# Patient Record
Sex: FEMALE | Race: WHITE | NOT HISPANIC OR LATINO | URBAN - METROPOLITAN AREA
[De-identification: names, ages, dates, MRNs, and addresses within clinical notes are randomized per-mention and may not be internally consistent; named-entity substitution may affect disease eponyms.]

---

## 2023-09-01 ENCOUNTER — HOSPITAL ENCOUNTER (OUTPATIENT)
Dept: DATA CONVERSION | Facility: HOSPITAL | Age: 58
Discharge: HOME | End: 2023-09-01

## 2023-09-01 DIAGNOSIS — Z01.818 ENCOUNTER FOR OTHER PREPROCEDURAL EXAMINATION: ICD-10-CM

## 2023-09-01 LAB
ANION GAP SERPL CALCULATED.3IONS-SCNC: 12 MMOL/L (ref 0–19)
BACTERIA SPEC CULT: NORMAL
BASOPHILS # BLD AUTO: 0.07 K/UL (ref 0–0.22)
BASOPHILS NFR BLD AUTO: 1.3 % (ref 0–1)
BUN SERPL-MCNC: 13 MG/DL (ref 8–25)
BUN/CREAT SERPL: 18.6 RATIO (ref 8–21)
CALCIUM SERPL-MCNC: 9.1 MG/DL (ref 8.5–10.4)
CC # UR: NORMAL /UL
CHLORIDE SERPL-SCNC: 106 MMOL/L (ref 97–107)
CO2 SERPL-SCNC: 23 MMOL/L (ref 24–31)
CREAT SERPL-MCNC: 0.7 MG/DL (ref 0.4–1.6)
DEPRECATED RDW RBC AUTO: 47.1 FL (ref 37–54)
DIFFERENTIAL METHOD BLD: ABNORMAL
EOSINOPHIL # BLD AUTO: 0.2 K/UL (ref 0–0.45)
EOSINOPHIL NFR BLD: 3.8 % (ref 0–3)
ERYTHROCYTE [DISTWIDTH] IN BLOOD BY AUTOMATED COUNT: 13.9 % (ref 11.7–15)
GFR SERPL CREATININE-BSD FRML MDRD: 101 ML/MIN/1.73 M2
GLUCOSE SERPL-MCNC: 95 MG/DL (ref 65–99)
HCT VFR BLD AUTO: 39.4 % (ref 36–44)
HGB BLD-MCNC: 12.7 GM/DL (ref 12–15)
IMM GRANULOCYTES # BLD AUTO: 0.01 K/UL (ref 0–0.1)
LYMPHOCYTES # BLD AUTO: 1.7 K/UL (ref 1.2–3.2)
LYMPHOCYTES NFR BLD MANUAL: 31.9 % (ref 20–40)
MCH RBC QN AUTO: 29.5 PG (ref 26–34)
MCHC RBC AUTO-ENTMCNC: 32.2 % (ref 31–37)
MCV RBC AUTO: 91.6 FL (ref 80–100)
MONOCYTES # BLD AUTO: 0.47 K/UL (ref 0–0.8)
MONOCYTES NFR BLD MANUAL: 8.8 % (ref 0–8)
NEUTROPHILS # BLD AUTO: 2.88 K/UL
NEUTROPHILS # BLD AUTO: 2.88 K/UL (ref 1.8–7.7)
NEUTROPHILS.IMMATURE NFR BLD: 0.2 % (ref 0–1)
NEUTS SEG NFR BLD: 54 % (ref 50–70)
NRBC BLD-RTO: 0 /100 WBC
PLATELET # BLD AUTO: 238 K/UL (ref 150–450)
PMV BLD AUTO: 9.8 CU (ref 7–12.6)
POTASSIUM SERPL-SCNC: 4.4 MMOL/L (ref 3.4–5.1)
RBC # BLD AUTO: 4.3 M/UL (ref 4–4.9)
REPORT STATUS -LH SQ DATA CONVERSION: NORMAL
SERVICE CMNT-IMP: NORMAL
SODIUM SERPL-SCNC: 141 MMOL/L (ref 133–145)
SPECIMEN SOURCE: NORMAL
WBC # BLD AUTO: 5.3 K/UL (ref 4.5–11)

## 2024-08-23 ENCOUNTER — APPOINTMENT (OUTPATIENT)
Dept: RADIOLOGY | Facility: HOSPITAL | Age: 59
End: 2024-08-23
Payer: COMMERCIAL

## 2024-08-23 DIAGNOSIS — Z12.31 SCREENING MAMMOGRAM FOR BREAST CANCER: ICD-10-CM

## 2024-09-27 ENCOUNTER — APPOINTMENT (OUTPATIENT)
Dept: OBSTETRICS AND GYNECOLOGY | Facility: CLINIC | Age: 59
End: 2024-09-27
Payer: COMMERCIAL

## 2024-09-30 PROCEDURE — 88142 CYTOPATH C/V THIN LAYER: CPT

## 2024-09-30 PROCEDURE — 87624 HPV HI-RISK TYP POOLED RSLT: CPT

## 2024-10-01 ENCOUNTER — HOSPITAL ENCOUNTER (OUTPATIENT)
Dept: RADIOLOGY | Facility: HOSPITAL | Age: 59
Discharge: HOME | End: 2024-10-01
Payer: COMMERCIAL

## 2024-10-01 VITALS — HEIGHT: 69 IN | BODY MASS INDEX: 30.36 KG/M2 | WEIGHT: 205 LBS

## 2024-10-01 DIAGNOSIS — Z12.31 SCREENING MAMMOGRAM FOR BREAST CANCER: ICD-10-CM

## 2024-10-01 PROCEDURE — 77063 BREAST TOMOSYNTHESIS BI: CPT | Performed by: RADIOLOGY

## 2024-10-01 PROCEDURE — 77067 SCR MAMMO BI INCL CAD: CPT

## 2024-10-01 PROCEDURE — 77067 SCR MAMMO BI INCL CAD: CPT | Performed by: RADIOLOGY

## 2024-10-02 ENCOUNTER — LAB REQUISITION (OUTPATIENT)
Dept: LAB | Facility: HOSPITAL | Age: 59
End: 2024-10-02
Payer: COMMERCIAL

## 2024-10-02 DIAGNOSIS — Z11.51 ENCOUNTER FOR SCREENING FOR HUMAN PAPILLOMAVIRUS (HPV): ICD-10-CM

## 2024-10-02 DIAGNOSIS — Z12.4 ENCOUNTER FOR SCREENING FOR MALIGNANT NEOPLASM OF CERVIX: ICD-10-CM

## 2024-10-02 DIAGNOSIS — Z01.419 ENCOUNTER FOR GYNECOLOGICAL EXAMINATION (GENERAL) (ROUTINE) WITHOUT ABNORMAL FINDINGS: ICD-10-CM

## 2024-10-04 ENCOUNTER — LAB (OUTPATIENT)
Dept: LAB | Facility: LAB | Age: 59
End: 2024-10-04
Payer: COMMERCIAL

## 2024-10-04 ENCOUNTER — OFFICE VISIT (OUTPATIENT)
Dept: PRIMARY CARE | Facility: CLINIC | Age: 59
End: 2024-10-04
Payer: COMMERCIAL

## 2024-10-04 VITALS
HEART RATE: 55 BPM | HEIGHT: 69 IN | WEIGHT: 204 LBS | BODY MASS INDEX: 30.21 KG/M2 | DIASTOLIC BLOOD PRESSURE: 72 MMHG | SYSTOLIC BLOOD PRESSURE: 120 MMHG | OXYGEN SATURATION: 99 %

## 2024-10-04 DIAGNOSIS — E05.90 HYPERTHYROIDISM: ICD-10-CM

## 2024-10-04 DIAGNOSIS — E04.1 THYROID NODULE: Primary | ICD-10-CM

## 2024-10-04 DIAGNOSIS — E04.1 THYROID NODULE: ICD-10-CM

## 2024-10-04 PROBLEM — M16.11 OSTEOARTHRITIS OF RIGHT HIP: Status: ACTIVE | Noted: 2024-10-04

## 2024-10-04 PROBLEM — N81.11 MIDLINE CYSTOCELE: Status: ACTIVE | Noted: 2024-10-04

## 2024-10-04 PROBLEM — N95.1 PERI-MENOPAUSE: Status: ACTIVE | Noted: 2024-10-04

## 2024-10-04 PROBLEM — H52.7 REFRACTIVE ERROR: Status: RESOLVED | Noted: 2024-10-04 | Resolved: 2024-10-04

## 2024-10-04 PROBLEM — R73.01 IMPAIRED FASTING GLUCOSE: Status: ACTIVE | Noted: 2024-10-04

## 2024-10-04 PROBLEM — H35.62 RETINAL HEMORRHAGE OF LEFT EYE: Status: RESOLVED | Noted: 2024-10-04 | Resolved: 2024-10-04

## 2024-10-04 PROBLEM — H33.309 DEFECT, RETINAL: Status: ACTIVE | Noted: 2024-10-04

## 2024-10-04 PROBLEM — I83.90 VARICOSE VEINS OF LOWER EXTREMITY: Status: ACTIVE | Noted: 2024-10-04

## 2024-10-04 LAB — TSH SERPL-ACNC: 2.1 MIU/L (ref 0.44–3.98)

## 2024-10-04 PROCEDURE — 86376 MICROSOMAL ANTIBODY EACH: CPT

## 2024-10-04 PROCEDURE — 84482 T3 REVERSE: CPT

## 2024-10-04 PROCEDURE — 1036F TOBACCO NON-USER: CPT | Performed by: PHYSICIAN ASSISTANT

## 2024-10-04 PROCEDURE — 84480 ASSAY TRIIODOTHYRONINE (T3): CPT

## 2024-10-04 PROCEDURE — 36415 COLL VENOUS BLD VENIPUNCTURE: CPT

## 2024-10-04 PROCEDURE — 3008F BODY MASS INDEX DOCD: CPT | Performed by: PHYSICIAN ASSISTANT

## 2024-10-04 PROCEDURE — 99203 OFFICE O/P NEW LOW 30 MIN: CPT | Performed by: PHYSICIAN ASSISTANT

## 2024-10-04 PROCEDURE — 84443 ASSAY THYROID STIM HORMONE: CPT

## 2024-10-04 RX ORDER — OMEGA-3 FATTY ACIDS 1000 MG
1 CAPSULE ORAL DAILY
COMMUNITY

## 2024-10-04 RX ORDER — UBIDECARENONE 75 MG
1000 CAPSULE ORAL DAILY
COMMUNITY

## 2024-10-04 RX ORDER — VIT C/E/ZN/COPPR/LUTEIN/ZEAXAN 250MG-90MG
25 CAPSULE ORAL DAILY
COMMUNITY

## 2024-10-04 RX ORDER — ASCORBIC ACID 500 MG
500 TABLET ORAL DAILY
COMMUNITY

## 2024-10-04 ASSESSMENT — ENCOUNTER SYMPTOMS
FATIGUE: 1
WEIGHT GAIN: 1
TREMORS: 0

## 2024-10-04 ASSESSMENT — PAIN SCALES - GENERAL: PAINLEVEL: 0-NO PAIN

## 2024-10-04 NOTE — PROGRESS NOTES
"Subjective   Patient ID: Viridiana Munoz is a 58 y.o. female who presents for Establish Care and Thyroid Problem (Has never been on medication, history of thyroid cyst and hyperthyroidism.).    Thyroid Problem  Presents for initial visit. Symptoms include fatigue, menstrual problem and weight gain. Patient reports no tremors.    Had thyroid scan and uptake over 10 years ago. Was never placed on any hormone treatment for her thyroid.     Review of Systems   Constitutional:  Positive for fatigue and weight gain.   Genitourinary:  Positive for menstrual problem.   Neurological:  Negative for tremors.       Objective   /72   Pulse 55   Ht 1.753 m (5' 9\")   Wt 92.5 kg (204 lb)   LMP  (LMP Unknown)   SpO2 99%   BMI 30.13 kg/m²     Physical Exam  Constitutional:       Appearance: Normal appearance.   Eyes:      Extraocular Movements: Extraocular movements intact.      Pupils: Pupils are equal, round, and reactive to light.   Neck:      Thyroid: Thyromegaly present.   Cardiovascular:      Rate and Rhythm: Normal rate and regular rhythm.      Pulses: Normal pulses.      Heart sounds: Normal heart sounds.   Pulmonary:      Effort: Pulmonary effort is normal.   Musculoskeletal:      Cervical back: Neck supple.   Neurological:      General: No focal deficit present.      Mental Status: She is alert. Mental status is at baseline.   Psychiatric:         Mood and Affect: Mood normal.         Thought Content: Thought content normal.         Judgment: Judgment normal.         Assessment/Plan   Diagnoses and all orders for this visit:  Thyroid nodule  -     TSH with reflex to Free T4 if abnormal; Future  -     Thyroid Peroxidase (TPO) Antibody; Future  -     T3; Future  -     T3, Reverse; Future  -     US thyroid; Future  Hyperthyroidism  -     TSH with reflex to Free T4 if abnormal; Future  Other orders  -     Follow Up In Primary Care - Health Maintenance; Future    Will follow up for her CPE. Will see what her labs " and US show. Reviewed her last set of labs and thyroid US results.

## 2024-10-05 LAB
T3 SERPL-MCNC: 119 NG/DL (ref 60–200)
THYROPEROXIDASE AB SERPL-ACNC: <28 IU/ML

## 2024-10-09 LAB — T3REVERSE SERPL-MCNC: 25.3 NG/DL (ref 9–27)

## 2024-10-11 ENCOUNTER — HOSPITAL ENCOUNTER (OUTPATIENT)
Dept: RADIOLOGY | Facility: HOSPITAL | Age: 59
Discharge: HOME | End: 2024-10-11
Payer: COMMERCIAL

## 2024-10-11 DIAGNOSIS — E04.1 THYROID NODULE: ICD-10-CM

## 2024-10-11 PROCEDURE — 88305 TISSUE EXAM BY PATHOLOGIST: CPT | Performed by: PATHOLOGY

## 2024-10-11 PROCEDURE — 76536 US EXAM OF HEAD AND NECK: CPT

## 2024-10-11 PROCEDURE — 88305 TISSUE EXAM BY PATHOLOGIST: CPT

## 2024-10-14 ENCOUNTER — LAB REQUISITION (OUTPATIENT)
Dept: LAB | Facility: HOSPITAL | Age: 59
End: 2024-10-14
Payer: COMMERCIAL

## 2024-10-14 DIAGNOSIS — N95.0 POSTMENOPAUSAL BLEEDING: ICD-10-CM

## 2024-10-14 LAB
CYTOLOGY CMNT CVX/VAG CYTO-IMP: NORMAL
HPV HR 12 DNA GENITAL QL NAA+PROBE: NEGATIVE
HPV HR GENOTYPES PNL CVX NAA+PROBE: NEGATIVE
HPV16 DNA SPEC QL NAA+PROBE: NEGATIVE
HPV18 DNA SPEC QL NAA+PROBE: NEGATIVE
LAB AP HPV GENOTYPE QUESTION: YES
LAB AP HPV HR: NORMAL
LABORATORY COMMENT REPORT: NORMAL
LABORATORY COMMENT REPORT: NORMAL
MENSTRUAL HX REPORTED: NORMAL
PATH REPORT.TOTAL CANCER: NORMAL

## 2024-10-17 LAB
LABORATORY COMMENT REPORT: NORMAL
PATH REPORT.FINAL DX SPEC: NORMAL
PATH REPORT.GROSS SPEC: NORMAL
PATH REPORT.RELEVANT HX SPEC: NORMAL
PATH REPORT.TOTAL CANCER: NORMAL

## 2024-11-08 ENCOUNTER — APPOINTMENT (OUTPATIENT)
Dept: PRIMARY CARE | Facility: CLINIC | Age: 59
End: 2024-11-08
Payer: COMMERCIAL

## 2024-11-08 VITALS
SYSTOLIC BLOOD PRESSURE: 112 MMHG | DIASTOLIC BLOOD PRESSURE: 68 MMHG | OXYGEN SATURATION: 98 % | HEART RATE: 76 BPM | BODY MASS INDEX: 29.53 KG/M2 | WEIGHT: 200 LBS

## 2024-11-08 DIAGNOSIS — E04.1 THYROID NODULE: ICD-10-CM

## 2024-11-08 DIAGNOSIS — Z00.00 ROUTINE GENERAL MEDICAL EXAMINATION AT A HEALTH CARE FACILITY: Primary | ICD-10-CM

## 2024-11-08 DIAGNOSIS — I83.813 VARICOSE VEINS OF BOTH LOWER EXTREMITIES WITH PAIN: ICD-10-CM

## 2024-11-08 DIAGNOSIS — Z12.11 COLON CANCER SCREENING: ICD-10-CM

## 2024-11-08 DIAGNOSIS — E05.90 HYPERTHYROIDISM: ICD-10-CM

## 2024-11-08 DIAGNOSIS — R73.01 IMPAIRED FASTING GLUCOSE: ICD-10-CM

## 2024-11-08 PROBLEM — N95.0 POST-MENOPAUSAL BLEEDING: Status: RESOLVED | Noted: 2024-11-08 | Resolved: 2024-11-08

## 2024-11-08 PROCEDURE — 1036F TOBACCO NON-USER: CPT | Performed by: PHYSICIAN ASSISTANT

## 2024-11-08 PROCEDURE — 99396 PREV VISIT EST AGE 40-64: CPT | Performed by: PHYSICIAN ASSISTANT

## 2024-11-08 ASSESSMENT — ENCOUNTER SYMPTOMS
DIZZINESS: 0
SHORTNESS OF BREATH: 0
SLEEP DISTURBANCE: 0
ACTIVITY CHANGE: 0
MYALGIAS: 0
BLOOD IN STOOL: 0
FATIGUE: 1
COLOR CHANGE: 0
CONSTIPATION: 0
CHEST TIGHTNESS: 0
NERVOUS/ANXIOUS: 0
PALPITATIONS: 0
ABDOMINAL PAIN: 0
APPETITE CHANGE: 0
LIGHT-HEADEDNESS: 0
WHEEZING: 0
ARTHRALGIAS: 0
FREQUENCY: 0
TREMORS: 0
DIARRHEA: 0
NAUSEA: 0

## 2024-11-08 ASSESSMENT — PATIENT HEALTH QUESTIONNAIRE - PHQ9
2. FEELING DOWN, DEPRESSED OR HOPELESS: NOT AT ALL
1. LITTLE INTEREST OR PLEASURE IN DOING THINGS: NOT AT ALL
SUM OF ALL RESPONSES TO PHQ9 QUESTIONS 1 AND 2: 0

## 2024-11-08 ASSESSMENT — PROMIS GLOBAL HEALTH SCALE
CARRYOUT_SOCIAL_ACTIVITIES: VERY GOOD
RATE_PHYSICAL_HEALTH: EXCELLENT
RATE_QUALITY_OF_LIFE: EXCELLENT
RATE_AVERAGE_PAIN: 1
CARRYOUT_PHYSICAL_ACTIVITIES: COMPLETELY
EMOTIONAL_PROBLEMS: SOMETIMES
RATE_SOCIAL_SATISFACTION: VERY GOOD
RATE_GENERAL_HEALTH: EXCELLENT
RATE_MENTAL_HEALTH: VERY GOOD

## 2024-11-08 ASSESSMENT — PAIN SCALES - GENERAL: PAINLEVEL_OUTOF10: 0-NO PAIN

## 2024-11-08 NOTE — PROGRESS NOTES
Subjective   Patient ID: Viridiana Munoz is a 58 y.o. female who presents for Annual Exam (Sees gyn).    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  Been seeing her GYN for an episode of postmenopausal bleeding.  Had a endometrial biopsy done along with ultrasound and so far is looking good.  Will follow-up as suggested.  Review of Systems   Constitutional:  Positive for fatigue. Negative for activity change and appetite change.   HENT:  Negative for dental problem.    Eyes:  Negative for visual disturbance.   Respiratory:  Negative for chest tightness, shortness of breath and wheezing.    Cardiovascular:  Negative for chest pain, palpitations and leg swelling.   Gastrointestinal:  Negative for abdominal pain, blood in stool, constipation, diarrhea and nausea.   Endocrine: Negative for cold intolerance and heat intolerance.   Genitourinary:  Negative for frequency and urgency.   Musculoskeletal:  Negative for arthralgias and myalgias.   Skin:  Negative for color change.   Allergic/Immunologic: Negative for immunocompromised state.   Neurological:  Negative for dizziness, tremors and light-headedness.   Psychiatric/Behavioral:  Negative for behavioral problems and sleep disturbance. The patient is not nervous/anxious.        Objective   /68   Pulse 76   Wt 90.7 kg (200 lb)   LMP  (LMP Unknown)   SpO2 98%   BMI 29.53 kg/m²     Physical Exam  HENT:      Right Ear: Tympanic membrane normal.      Left Ear: Tympanic membrane normal.      Nose: Nose normal.      Mouth/Throat:      Mouth: Mucous membranes are moist.   Eyes:      Extraocular Movements: Extraocular movements intact.      Pupils: Pupils are equal, round, and reactive to light.   Neck:      Thyroid: Thyromegaly present.      Vascular: No carotid bruit.   Cardiovascular:      Rate and Rhythm: Normal rate and regular rhythm.      Pulses: Normal pulses.   Pulmonary:      Effort: Pulmonary effort is normal. No respiratory distress.    Abdominal:      General: Bowel sounds are normal.      Tenderness: There is no abdominal tenderness.   Musculoskeletal:      Cervical back: Normal range of motion. No tenderness.      Right lower leg: No edema.      Left lower leg: No edema.   Skin:     General: Skin is warm.      Comments: She has varicose veins about mid thigh down bilaterally.  The ones behind the right knee really bother her.   Neurological:      General: No focal deficit present.      Mental Status: She is alert. Mental status is at baseline.   Psychiatric:         Mood and Affect: Mood normal.         Thought Content: Thought content normal.         Judgment: Judgment normal.         Assessment/Plan   Diagnoses and all orders for this visit:  Routine general medical examination at a health care facility  -     CBC and Auto Differential; Future  -     Lipid Panel; Future  -     Urinalysis with Reflex Microscopic; Future  Impaired fasting glucose  -     Comprehensive Metabolic Panel; Future  -     Urinalysis with Reflex Microscopic; Future  -     Hemoglobin A1C; Future  Hyperthyroidism  -     Comprehensive Metabolic Panel; Future  -     TSH with reflex to Free T4 if abnormal; Future  Thyroid nodule  -     Referral to ENT; Future  Varicose veins of both lower extremities with pain  -     Referral to Vascular Surgery; Future  Colon cancer screening  -     Referral to Gastroenterology; Future  Other orders  -     Follow Up In Primary Care - Health Maintenance    He is also due for colonoscopy.  Her last 1 she did have a few polyps.  Her recent thyroid ultrasound did show a few nodules but she is never seen ENT in the past to see if any biopsied.  Will refer accordingly.

## 2024-11-23 ENCOUNTER — LAB (OUTPATIENT)
Dept: LAB | Facility: LAB | Age: 59
End: 2024-11-23
Payer: COMMERCIAL

## 2024-11-23 DIAGNOSIS — Z00.00 ROUTINE GENERAL MEDICAL EXAMINATION AT A HEALTH CARE FACILITY: ICD-10-CM

## 2024-11-23 DIAGNOSIS — R73.01 IMPAIRED FASTING GLUCOSE: ICD-10-CM

## 2024-11-23 DIAGNOSIS — E05.90 HYPERTHYROIDISM: ICD-10-CM

## 2024-11-23 LAB
ALBUMIN SERPL BCP-MCNC: 4.2 G/DL (ref 3.4–5)
ALP SERPL-CCNC: 52 U/L (ref 33–110)
ALT SERPL W P-5'-P-CCNC: 13 U/L (ref 7–45)
ANION GAP SERPL CALCULATED.3IONS-SCNC: 9 MMOL/L (ref 10–20)
APPEARANCE UR: CLEAR
AST SERPL W P-5'-P-CCNC: 22 U/L (ref 9–39)
BASOPHILS # BLD AUTO: 0.09 X10*3/UL (ref 0–0.1)
BASOPHILS NFR BLD AUTO: 1.3 %
BILIRUB SERPL-MCNC: 0.4 MG/DL (ref 0–1.2)
BILIRUB UR STRIP.AUTO-MCNC: NEGATIVE MG/DL
BUN SERPL-MCNC: 11 MG/DL (ref 6–23)
CALCIUM SERPL-MCNC: 8.9 MG/DL (ref 8.6–10.3)
CHLORIDE SERPL-SCNC: 107 MMOL/L (ref 98–107)
CHOLEST SERPL-MCNC: 168 MG/DL (ref 0–199)
CHOLEST/HDLC SERPL: 3.2 {RATIO}
CO2 SERPL-SCNC: 27 MMOL/L (ref 21–32)
COLOR UR: COLORLESS
CREAT SERPL-MCNC: 0.6 MG/DL (ref 0.5–1.05)
EGFRCR SERPLBLD CKD-EPI 2021: >90 ML/MIN/1.73M*2
EOSINOPHIL # BLD AUTO: 0.16 X10*3/UL (ref 0–0.7)
EOSINOPHIL NFR BLD AUTO: 2.4 %
ERYTHROCYTE [DISTWIDTH] IN BLOOD BY AUTOMATED COUNT: 13 % (ref 11.5–14.5)
EST. AVERAGE GLUCOSE BLD GHB EST-MCNC: 120 MG/DL
GLUCOSE SERPL-MCNC: 98 MG/DL (ref 74–99)
GLUCOSE UR STRIP.AUTO-MCNC: NORMAL MG/DL
HBA1C MFR BLD: 5.8 %
HCT VFR BLD AUTO: 40.1 % (ref 36–46)
HDLC SERPL-MCNC: 52.8 MG/DL
HGB BLD-MCNC: 13.2 G/DL (ref 12–16)
IMM GRANULOCYTES # BLD AUTO: 0.05 X10*3/UL (ref 0–0.7)
IMM GRANULOCYTES NFR BLD AUTO: 0.7 % (ref 0–0.9)
KETONES UR STRIP.AUTO-MCNC: NEGATIVE MG/DL
LDLC SERPL CALC-MCNC: 103 MG/DL
LEUKOCYTE ESTERASE UR QL STRIP.AUTO: NEGATIVE
LYMPHOCYTES # BLD AUTO: 2.41 X10*3/UL (ref 1.2–4.8)
LYMPHOCYTES NFR BLD AUTO: 35.8 %
MCH RBC QN AUTO: 29.7 PG (ref 26–34)
MCHC RBC AUTO-ENTMCNC: 32.9 G/DL (ref 32–36)
MCV RBC AUTO: 90 FL (ref 80–100)
MONOCYTES # BLD AUTO: 0.55 X10*3/UL (ref 0.1–1)
MONOCYTES NFR BLD AUTO: 8.2 %
NEUTROPHILS # BLD AUTO: 3.47 X10*3/UL (ref 1.2–7.7)
NEUTROPHILS NFR BLD AUTO: 51.6 %
NITRITE UR QL STRIP.AUTO: NEGATIVE
NON HDL CHOLESTEROL: 115 MG/DL (ref 0–149)
NRBC BLD-RTO: 0 /100 WBCS (ref 0–0)
PH UR STRIP.AUTO: 6 [PH]
PLATELET # BLD AUTO: 233 X10*3/UL (ref 150–450)
POTASSIUM SERPL-SCNC: 4.3 MMOL/L (ref 3.5–5.3)
PROT SERPL-MCNC: 6.9 G/DL (ref 6.4–8.2)
PROT UR STRIP.AUTO-MCNC: NEGATIVE MG/DL
RBC # BLD AUTO: 4.44 X10*6/UL (ref 4–5.2)
RBC # UR STRIP.AUTO: NEGATIVE /UL
SODIUM SERPL-SCNC: 139 MMOL/L (ref 136–145)
SP GR UR STRIP.AUTO: 1
TRIGL SERPL-MCNC: 60 MG/DL (ref 0–149)
TSH SERPL-ACNC: 1.9 MIU/L (ref 0.44–3.98)
UROBILINOGEN UR STRIP.AUTO-MCNC: NORMAL MG/DL
VLDL: 12 MG/DL (ref 0–40)
WBC # BLD AUTO: 6.7 X10*3/UL (ref 4.4–11.3)

## 2024-11-23 PROCEDURE — 85025 COMPLETE CBC W/AUTO DIFF WBC: CPT

## 2024-11-23 PROCEDURE — 81003 URINALYSIS AUTO W/O SCOPE: CPT

## 2024-11-23 PROCEDURE — 84443 ASSAY THYROID STIM HORMONE: CPT

## 2024-11-23 PROCEDURE — 36415 COLL VENOUS BLD VENIPUNCTURE: CPT

## 2024-11-23 PROCEDURE — 80053 COMPREHEN METABOLIC PANEL: CPT

## 2024-11-23 PROCEDURE — 83036 HEMOGLOBIN GLYCOSYLATED A1C: CPT

## 2024-11-23 PROCEDURE — 80061 LIPID PANEL: CPT

## 2025-03-22 ENCOUNTER — ANCILLARY PROCEDURE (OUTPATIENT)
Dept: URGENT CARE | Age: 60
End: 2025-03-22
Payer: COMMERCIAL

## 2025-03-22 ENCOUNTER — OFFICE VISIT (OUTPATIENT)
Dept: URGENT CARE | Age: 60
End: 2025-03-22
Payer: COMMERCIAL

## 2025-03-22 VITALS
SYSTOLIC BLOOD PRESSURE: 127 MMHG | TEMPERATURE: 98.2 F | RESPIRATION RATE: 19 BRPM | DIASTOLIC BLOOD PRESSURE: 79 MMHG | OXYGEN SATURATION: 98 % | BODY MASS INDEX: 30.27 KG/M2 | HEART RATE: 64 BPM | WEIGHT: 205 LBS

## 2025-03-22 DIAGNOSIS — R05.1 ACUTE COUGH: ICD-10-CM

## 2025-03-22 DIAGNOSIS — R06.2 WHEEZING: ICD-10-CM

## 2025-03-22 DIAGNOSIS — J20.9 ACUTE BRONCHITIS, UNSPECIFIED ORGANISM: Primary | ICD-10-CM

## 2025-03-22 PROCEDURE — 71046 X-RAY EXAM CHEST 2 VIEWS: CPT | Performed by: NURSE PRACTITIONER

## 2025-03-22 RX ORDER — PREDNISONE 20 MG/1
40 TABLET ORAL DAILY
Qty: 10 TABLET | Refills: 0 | Status: SHIPPED | OUTPATIENT
Start: 2025-03-22 | End: 2025-03-27

## 2025-03-22 RX ORDER — ALBUTEROL SULFATE 90 UG/1
2 INHALANT RESPIRATORY (INHALATION) EVERY 6 HOURS PRN
Qty: 18 G | Refills: 0 | Status: SHIPPED | OUTPATIENT
Start: 2025-03-22 | End: 2026-03-22

## 2025-03-22 RX ORDER — AZITHROMYCIN 250 MG/1
TABLET, FILM COATED ORAL
Qty: 6 TABLET | Refills: 0 | Status: SHIPPED | OUTPATIENT
Start: 2025-03-22

## 2025-03-22 RX ORDER — BENZONATATE 200 MG/1
200 CAPSULE ORAL 3 TIMES DAILY PRN
Qty: 21 CAPSULE | Refills: 0 | Status: SHIPPED | OUTPATIENT
Start: 2025-03-22 | End: 2025-03-29

## 2025-03-22 ASSESSMENT — ENCOUNTER SYMPTOMS
SORE THROAT: 1
CHEST TIGHTNESS: 1
VOICE CHANGE: 1
COUGH: 1
WHEEZING: 1
FATIGUE: 1
SHORTNESS OF BREATH: 1
VOMITING: 1

## 2025-03-22 NOTE — PROGRESS NOTES
Subjective   Patient ID: Viridiana Munoz is a 59 y.o. female. They present today with a chief complaint of Cough (Was positive with covid last day, she is feeling better, her cough has worsened, keeping her up at night, educing vomiting, she is unable to to take cough suppressants due to a adverse reaction.).    History of Present Illness  Patient presents today with a chief complaint of Cough. Reports testing positive for Covid about 10 days ago. Her other symptoms subsided but cough has gotten worse. She is unable to sleep at night due to continues and forceful coughing. Unable to take OTC with Dextromethorphan. Reports vomiting due to forceful cough, but denies diarrhea or fever.     Past Medical History  Allergies as of 03/22/2025 - Reviewed 03/22/2025   Allergen Reaction Noted    Dextromethorphan Anxiety 10/04/2024    Sertraline Anxiety 10/04/2024       (Not in a hospital admission)       Past Medical History:   Diagnosis Date    Acute pharyngitis, unspecified 09/17/2015    Sore throat    BRCA1 negative 7/2020    BRCA2 negative 7/2020    Fibrocystic breast 1/2012    Other specified health status 02/11/2015    No pertinent past medical history    Personal history of other endocrine, nutritional and metabolic disease 12/11/2015    History of hyperthyroidism    Post-menopausal bleeding 11/08/2024    Refractive error 10/04/2024    Retinal hemorrhage of left eye 10/04/2024       Past Surgical History:   Procedure Laterality Date    BI STEREOTACTIC GUIDED BREAST LEFT LOCALIZATION AND BIOPSY Left 06/19/2019    BI STEREOTACTIC GUIDED BREAST LOCALIZATION AND BIOPSY LEFT [facility]    BREAST BIOPSY  7/2020    JOINT REPLACEMENT Right 9/28/2023    hip    TONSILLECTOMY  12/11/1973    Tonsillectomy        reports that she quit smoking about 35 years ago. Her smoking use included cigarettes. She started smoking about 43 years ago. She has a 8 pack-year smoking history. She has never used smokeless tobacco. She  reports that she does not currently use alcohol. She reports that she does not use drugs.    Review of Systems  Review of Systems   Constitutional:  Positive for fatigue.   HENT:  Positive for congestion, sore throat and voice change.    Respiratory:  Positive for cough, chest tightness, shortness of breath and wheezing.    Gastrointestinal:  Positive for vomiting.   All other systems reviewed and are negative.                                 Objective    Vitals:    03/22/25 0811   BP: 127/79   BP Location: Left arm   Patient Position: Sitting   BP Cuff Size: Adult long   Pulse: 64   Resp: 19   Temp: 36.8 °C (98.2 °F)   TempSrc: Oral   SpO2: 98%   Weight: 93 kg (205 lb)     No LMP recorded (lmp unknown). Patient is postmenopausal.    Physical Exam  Vitals reviewed.   General: Vitals Noted. No distress. Normocephalic.  Cardiovascular:     Heart sounds: Normal heart sounds, S1 normal and S2 normal. No murmur heard.     No friction rub.   Pulmonary:      Effort: Pulmonary effort is normal.      Breath sounds:  Lungs are diminished with expiratory wheezing to auscultation bilaterally   HEENT: Left and right TM is within normal limits. No drainage.  EOMI, normal conjunctiva, patent nares, Normal OP No maxillary and frontal sinus tenderness on palpation is present. Pharynx and tonsils are hyperemic, and without exudate.   Neck: Supple with no adenopathy.  Lymphadenopathy:   No cervical adenopathy on palpation  Lower Body: No right inguinal adenopathy. No left inguinal adenopathy.   Abdominal:      Palpations: There is no hepatomegaly, splenomegaly or mass. Abdomen is soft, non-tender, and non-distended. No suprapubic tenderness. No CVA tenderness.   Skin:     Comments: no rash   Neurological:      Cranial Nerves: Cranial nerves 2-12 are intact.      Sensory: No sensory deficit.      Motor: Motor function is intact.      Deep Tendon Reflexes: Reflexes are normal and symmetric.       Procedures    Point of Care Test &  Imaging Results from this visit  No results found for this visit on 03/22/25.   XR chest 2 views    Result Date: 3/22/2025  Interpreted By:  Caterina Roe, STUDY: XR CHEST 2 VIEWS;  3/22/2025 8:25 am   INDICATION: Signs/Symptoms:cough.   ,R05.1 Acute cough,R06.2 Wheezing   COMPARISON: None.   ACCESSION NUMBER(S): [id number]   ORDERING CLINICIAN: DEVIKA PEREIRA   FINDINGS: No focal infiltrate, pleural effusion or evidence of pneumothorax. The cardiac silhouette is normal in size.  Degenerative endplate spurring in the thoracic spine.       No acute cardiopulmonary process radiographically.   MACRO: None.   Signed by: Caterina Roe 3/22/2025 9:04 AM Dictation workstation:   [id number]     Diagnostic study results (if any) were reviewed by EDI Singleton.    Assessment/Plan   Allergies, medications, history, and pertinent labs/EKGs/Imaging reviewed by EDI Singleton.     Medical Decision Making  On exam patient is non toxic, in no distress. Patient presented with symptoms of cough wheezing, and shortness of breath for over a week. Reports chest discomfort with breathing. Mostly none productive cough. Among differentials Bronchitis vs PNA. In the setting of ongoing cough, shortness of breath, Chest xray ordered to rule out PNA. Negative per report. Given patients presentation, treated for acute bronchitis in the setting of atypical infection.   Treatment with antibiotic, prednisone and inhaler as listed below.   Additionally to prescription, Will be discharged with instructions to increase fluid intake, use of OTC for symptoms relief. Advised to follow up with PCP for further management. Advised to return if symptoms worsen and needs to be reevaluated. Reviewed with patient signs and symptoms significant to present to ED. Patient verbalized understanding and agreed with the plan.        Orders and Diagnoses  Diagnoses and all orders for this visit:  Acute bronchitis, unspecified organism  -      predniSONE (Deltasone) 20 mg tablet; Take 2 tablets (40 mg) by mouth once daily for 5 days.  -     albuterol 90 mcg/actuation inhaler; Inhale 2 puffs every 6 hours if needed for wheezing.  -     azithromycin (Zithromax) 250 mg tablet; Take 2 tabs (500 mg) by mouth today, then take 1 tab daily for 4 days.  -     benzonatate (Tessalon) 200 mg capsule; Take 1 capsule (200 mg) by mouth 3 times a day as needed for cough for up to 7 days. Do not crush or chew.  Acute cough  -     XR chest 2 views; Future  -     predniSONE (Deltasone) 20 mg tablet; Take 2 tablets (40 mg) by mouth once daily for 5 days.  -     benzonatate (Tessalon) 200 mg capsule; Take 1 capsule (200 mg) by mouth 3 times a day as needed for cough for up to 7 days. Do not crush or chew.  Wheezing  -     XR chest 2 views; Future  -     predniSONE (Deltasone) 20 mg tablet; Take 2 tablets (40 mg) by mouth once daily for 5 days.  -     albuterol 90 mcg/actuation inhaler; Inhale 2 puffs every 6 hours if needed for wheezing.      Medical Admin Record      Patient disposition: Home    Electronically signed by EDI Singleton  9:11 AM

## 2025-05-06 ASSESSMENT — ENCOUNTER SYMPTOMS
DIARRHEA: 0
MYALGIAS: 0
HEMATURIA: 0
CONSTIPATION: 0
VOMITING: 0
NAUSEA: 0
ABDOMINAL PAIN: 1
FREQUENCY: 0
ANOREXIA: 0
FEVER: 0
ARTHRALGIAS: 0
FLATUS: 0
WEIGHT LOSS: 0
DYSURIA: 0
HEMATOCHEZIA: 0
HEADACHES: 0
BELCHING: 0

## 2025-05-09 ENCOUNTER — APPOINTMENT (OUTPATIENT)
Dept: PRIMARY CARE | Facility: CLINIC | Age: 60
End: 2025-05-09
Payer: COMMERCIAL

## 2025-05-09 VITALS
SYSTOLIC BLOOD PRESSURE: 108 MMHG | DIASTOLIC BLOOD PRESSURE: 64 MMHG | HEART RATE: 75 BPM | OXYGEN SATURATION: 96 % | WEIGHT: 200 LBS | TEMPERATURE: 97.3 F | BODY MASS INDEX: 29.53 KG/M2

## 2025-05-09 DIAGNOSIS — K59.01 SLOW TRANSIT CONSTIPATION: Primary | ICD-10-CM

## 2025-05-09 DIAGNOSIS — R10.11 RUQ PAIN: ICD-10-CM

## 2025-05-09 LAB
POC APPEARANCE, URINE: CLEAR
POC BILIRUBIN, URINE: NEGATIVE
POC BLOOD, URINE: ABNORMAL
POC COLOR, URINE: YELLOW
POC GLUCOSE, URINE: NEGATIVE MG/DL
POC KETONES, URINE: ABNORMAL MG/DL
POC LEUKOCYTES, URINE: NEGATIVE
POC NITRITE,URINE: NEGATIVE
POC PH, URINE: 5.5 PH
POC PROTEIN, URINE: NEGATIVE MG/DL
POC SPECIFIC GRAVITY, URINE: 1.02
POC UROBILINOGEN, URINE: 0.2 EU/DL

## 2025-05-09 PROCEDURE — 81003 URINALYSIS AUTO W/O SCOPE: CPT | Performed by: PHYSICIAN ASSISTANT

## 2025-05-09 PROCEDURE — 99213 OFFICE O/P EST LOW 20 MIN: CPT | Performed by: PHYSICIAN ASSISTANT

## 2025-05-09 PROCEDURE — 1036F TOBACCO NON-USER: CPT | Performed by: PHYSICIAN ASSISTANT

## 2025-05-09 ASSESSMENT — ENCOUNTER SYMPTOMS
CONSTIPATION: 0
FREQUENCY: 0
FEVER: 0
WEIGHT LOSS: 0
MYALGIAS: 0
BELCHING: 0
DIARRHEA: 0
ARTHRALGIAS: 0
DYSURIA: 0
HEMATURIA: 0
HEMATOCHEZIA: 0
HEADACHES: 0
FLATUS: 0
NAUSEA: 0
VOMITING: 0
ANOREXIA: 0
ABDOMINAL PAIN: 1

## 2025-05-09 ASSESSMENT — PAIN SCALES - GENERAL: PAINLEVEL_OUTOF10: 2

## 2025-05-09 NOTE — PROGRESS NOTES
Subjective   Patient ID: Viridiana Munoz is a 59 y.o. female who presents for Abdominal Pain (Dull RUQ pain for the past 2 months. Accompanied by bloating and nausea at times. Has tried taking famotidine with minimal relief. ).    Abdominal Pain  This is a new problem. The current episode started more than 1 month ago. The onset quality is gradual. The problem occurs daily. The most recent episode lasted 2 hours. The problem has been unchanged. The pain is located in the RUQ and epigastric region. The pain is at a severity of 2/10. The quality of the pain is dull and a sensation of fullness. The abdominal pain does not radiate. Pertinent negatives include no anorexia, arthralgias, belching, constipation, diarrhea, dysuria, fever, flatus, frequency, headaches, hematochezia, hematuria, melena, myalgias, nausea, vomiting or weight loss. The pain is aggravated by eating. The pain is relieved by Recumbency and standing.    Started after having COVID.  Worse after meals.  Tried pepcid with minimal help.  Better with smoothies.  Has 1 bowel movement a day usually    Review of Systems   Constitutional:  Negative for fever and weight loss.   Gastrointestinal:  Positive for abdominal pain. Negative for anorexia, constipation, diarrhea, flatus, hematochezia, melena, nausea and vomiting.   Genitourinary:  Negative for dysuria, frequency and hematuria.   Musculoskeletal:  Negative for arthralgias and myalgias.   Neurological:  Negative for headaches.       Objective   /64   Pulse 75   Temp 36.3 °C (97.3 °F)   Wt 90.7 kg (200 lb)   LMP  (LMP Unknown)   SpO2 96%   BMI 29.53 kg/m²     Physical Exam  Constitutional:       Appearance: She is obese.   Cardiovascular:      Rate and Rhythm: Normal rate and regular rhythm.   Pulmonary:      Effort: Pulmonary effort is normal.   Abdominal:      General: Bowel sounds are normal.      Tenderness: There is generalized abdominal tenderness. There is no guarding or rebound.  Negative signs include Kelly's sign, Rovsing's sign and McBurney's sign.      Hernia: No hernia is present.   Neurological:      Mental Status: She is alert.         Assessment/Plan   Diagnoses and all orders for this visit:  Slow transit constipation  -     XR abdomen 2 views supine and erect or decub; Future  RUQ pain  -     POCT UA Automated manually resulted  -     XR abdomen 2 views supine and erect or decub; Future    Will obtain an x-ray if no better in the next 2 weeks.  Did suggest starting probiotic and taking magnesium daily.  Sounds more like a constipation issue as opposed to gallbladder.  Also suggested IBgard if needed.      [No Acute Distress] : no acute distress [No Respiratory Distress] : no respiratory distress  [Clear to Auscultation] : lungs were clear to auscultation bilaterally [Normal Rate] : normal rate  [Regular Rhythm] : with a regular rhythm [Normal Affect] : the affect was normal [Normal Mood] : the mood was normal [Normal Outer Ear/Nose] : the outer ears and nose were normal in appearance [Normal Oropharynx] : the oropharynx was normal [Normal TMs] : both tympanic membranes were normal [Normal Nasal Mucosa] : the nasal mucosa was normal [No Lymphadenopathy] : no lymphadenopathy [de-identified] : + Pimple-like lesion, very small and superficial, not tender, left face in front of ear, NOT c/w LN

## 2025-05-09 NOTE — PROGRESS NOTES
Answers submitted by the patient for this visit:  Abdominal Pain Questionnaire (Submitted on 5/6/2025)  Chief Complaint: Abdominal pain  Chronicity: new  Onset: more than 1 month ago  Onset quality: gradual  Frequency: daily  Episode duration: 2 Hours  Progression since onset: unchanged  Pain location: RUQ, epigastric region  Pain - numeric: 2/10  Pain quality: dull, a sensation of fullness  Radiates to: does not radiate  anorexia: No  arthralgias: No  belching: No  constipation: No  diarrhea: No  dysuria: No  fever: No  flatus: No  frequency: No  headaches: No  hematochezia: No  hematuria: No  melena: No  myalgias: No  nausea: No  weight loss: No  vomiting: No  Aggravated by: eating  Relieved by: recumbency, standing

## 2025-05-16 ENCOUNTER — APPOINTMENT (OUTPATIENT)
Dept: OTOLARYNGOLOGY | Facility: CLINIC | Age: 60
End: 2025-05-16
Payer: COMMERCIAL

## 2025-05-16 VITALS — HEIGHT: 69 IN | WEIGHT: 202 LBS | BODY MASS INDEX: 29.92 KG/M2

## 2025-05-16 DIAGNOSIS — E05.90 HYPERTHYROIDISM: ICD-10-CM

## 2025-05-16 DIAGNOSIS — E06.0 ACUTE THYROIDITIS: ICD-10-CM

## 2025-05-16 DIAGNOSIS — E04.2 MULTINODULAR GOITER: Primary | ICD-10-CM

## 2025-05-16 PROCEDURE — 3008F BODY MASS INDEX DOCD: CPT | Performed by: OTOLARYNGOLOGY

## 2025-05-16 PROCEDURE — 1036F TOBACCO NON-USER: CPT | Performed by: OTOLARYNGOLOGY

## 2025-05-16 PROCEDURE — 99204 OFFICE O/P NEW MOD 45 MIN: CPT | Performed by: OTOLARYNGOLOGY

## 2025-05-16 NOTE — PROGRESS NOTES
"Chief Complaint   Patient presents with    New Patient Visit     NP- THYROID CK       Date of Evaluation: 5/16/2025   Viridiana was seen today for new patient visit.  Diagnoses and all orders for this visit:  Multinodular goiter (Primary)       PLAN  We have had a long discussion regarding multinodular goiter.  She had a remote history of acute thyroiditis with hyperthyroidism that resolved.  She has a very small subcentimeter nodules.  They do not meet the criteria for biopsy at this point but I would repeat an ultrasound in October 2025.  I will order this.  I believe she likely had an acute postviral thyroiditis in 2016 that accounted for her hyperthyroidism.  She is now euthyroid      HPI  Viridiana Munoz is a 59 y.o. female seen in consultation at the request of Shannan Camargo for her thyroid.  She had an acute thyroiditis with hyperthyroidism in 2016 that spontaneously resolved.  She is not on Synthroid.  She denies any pain when swallowing.  She denies hoarseness dysphagia or weight loss.  There is no family history of thyroid cancer.  Her TSH is 1.9.  October 2024 she had a thyroid ultrasound that shows:  Right lobe 5.9 cm left lobe 5.8 cm  Right inferior 0.9 cm T RADS 4 nodule  Left 0.5 cm T RADS 2 nodule, 0.9T rad 4 nodule    Physical Exam:  Last Recorded Vitals  Height 1.753 m (5' 9\"), weight 91.6 kg (202 lb). , Body mass index is 29.83 kg/m².  []General appearance: Well-developed, well-nourished in no acute distress, conversant with normal voice quality    Head/face: No erythema or edema or facial tenderness, and normal facial nerve function bilaterally    External ear: Clear external auditory canals with normal pinnae  Tube status: N/A  Middle ear: Tympanic membranes intact and mobile, middle ears normal.  Tympanic membrane perforation: N/A  Mastoid bowl: N/A  Hearing: Normal conversational awareness at normal speech thresholds    Nose visualized using: Anterior rhinoscopy  Nasal dorsum: Nontraumatic " midline appearance  Septum: Midline, nonobstructing  Inferior turbinates: Normal, pink  Secretions: Dry    Oral cavity and oropharynx: Normal  Teeth: Good condition  Floor of mouth: without lesions  Palate: Normal hard palate, soft palate and uvula  Oropharynx: Clear, no lesions present  Buccal mucosa: Normal without masses or lesions  Lips: Normal    Nasopharynx: Inadequate mirror exam secondary to gag/anatomy    Neck:  Salivary glands: Normal bilateral parotid and submandibular glands by inspection and palpation.  Non-thyroid masses: No palpable masses or significant lymphadenopathy  Trachea: Midline  Thyroid: No thyromegaly or palpable nodules  Temporomandibular joint: Nontender  Cervical range of motion: Normal    Neurologic exam: Alert and oriented x3, appropriate affect.  Cranial nerves II-XII normal bilaterally  Extraocular movement: Extraocular movement intact, normal gaze alignment     Medical History[1]   Surgical History[2]       Medications:   Current Outpatient Medications   Medication Instructions    ascorbic acid (VITAMIN C) 500 mg, Daily    cholecalciferol (VITAMIN D3) 25 mcg, Daily    cyanocobalamin (VITAMIN B-12) 1,000 mcg, Daily    omega-3 1,000 mg capsule capsule 1 capsule, Daily        Allergies:  Allergies[3]       Oli Gregg MD         [1]   Past Medical History:  Diagnosis Date    Acute pharyngitis, unspecified 09/17/2015    Sore throat    BRCA1 negative 7/2020    BRCA2 negative 7/2020    Fibrocystic breast 1/2012    Other specified health status 02/11/2015    No pertinent past medical history    Personal history of other endocrine, nutritional and metabolic disease 12/11/2015    History of hyperthyroidism    Post-menopausal bleeding 11/08/2024    Refractive error 10/04/2024    Retinal hemorrhage of left eye 10/04/2024   [2]   Past Surgical History:  Procedure Laterality Date    BI STEREOTACTIC GUIDED BREAST LEFT LOCALIZATION AND BIOPSY Left 06/19/2019    BI STEREOTACTIC GUIDED BREAST  LOCALIZATION AND BIOPSY LEFT [facility]    BREAST BIOPSY  7/2020    JOINT REPLACEMENT Right 9/28/2023    hip    TONSILLECTOMY  12/11/1973    Tonsillectomy   [3]   Allergies  Allergen Reactions    Dextromethorphan Anxiety    Sertraline Anxiety

## 2025-07-03 ENCOUNTER — OFFICE VISIT (OUTPATIENT)
Dept: URGENT CARE | Age: 60
End: 2025-07-03
Payer: COMMERCIAL

## 2025-07-03 VITALS
HEART RATE: 56 BPM | TEMPERATURE: 97.9 F | WEIGHT: 205 LBS | SYSTOLIC BLOOD PRESSURE: 153 MMHG | RESPIRATION RATE: 15 BRPM | OXYGEN SATURATION: 96 % | BODY MASS INDEX: 30.27 KG/M2 | DIASTOLIC BLOOD PRESSURE: 85 MMHG

## 2025-07-03 DIAGNOSIS — R05.1 ACUTE COUGH: ICD-10-CM

## 2025-07-03 DIAGNOSIS — J20.9 ACUTE BRONCHITIS, UNSPECIFIED ORGANISM: Primary | ICD-10-CM

## 2025-07-03 RX ORDER — DOXYCYCLINE 100 MG/1
100 CAPSULE ORAL 2 TIMES DAILY
Qty: 20 CAPSULE | Refills: 0 | Status: SHIPPED | OUTPATIENT
Start: 2025-07-03 | End: 2025-07-13

## 2025-07-03 RX ORDER — PREDNISONE 20 MG/1
40 TABLET ORAL DAILY
Qty: 10 TABLET | Refills: 0 | Status: SHIPPED | OUTPATIENT
Start: 2025-07-03 | End: 2025-07-08

## 2025-07-03 RX ORDER — BENZONATATE 200 MG/1
200 CAPSULE ORAL 3 TIMES DAILY PRN
Qty: 21 CAPSULE | Refills: 0 | Status: SHIPPED | OUTPATIENT
Start: 2025-07-03 | End: 2025-07-10

## 2025-07-03 ASSESSMENT — ENCOUNTER SYMPTOMS: COUGH: 1

## 2025-07-03 NOTE — PROGRESS NOTES
Subjective   Patient ID: Viridiana Munoz is a 59 y.o. female. They present today with a chief complaint of Chest Congestion  (1 week ) and Cough (1 week ).    History of Present Illness  Patient is a 59-year-old female presented complaining of ongoing cough, chest congestion, chest tightness and discomfort due to forceful cough.  She denies shortness of breath or dyspnea.    Past Medical History  Allergies as of 07/03/2025 - Reviewed 07/03/2025   Allergen Reaction Noted    Dextromethorphan Anxiety 10/04/2024    Sertraline Anxiety 10/04/2024       Prescriptions Prior to Admission[1]     Medical History[2]    Surgical History[3]     reports that she quit smoking about 35 years ago. Her smoking use included cigarettes. She started smoking about 43 years ago. She has a 8 pack-year smoking history. She has never used smokeless tobacco. She reports that she does not currently use alcohol. She reports that she does not use drugs.    Review of Systems  Review of Systems   Respiratory:  Positive for cough.                                   Objective    Vitals:    07/03/25 0818   BP: 153/85   Pulse: 56   Resp: 15   Temp: 36.6 °C (97.9 °F)   TempSrc: Oral   SpO2: 96%   Weight: 93 kg (205 lb)     No LMP recorded (lmp unknown). Patient is postmenopausal.    Physical Exam  Vitals reviewed.   General: Vitals Noted. No distress. Normocephalic.  Cardiovascular:     Heart sounds: Normal heart sounds, S1 normal and S2 normal. No murmur heard.     No friction rub.   Pulmonary:      Effort: Pulmonary effort is normal.      Breath sounds:  Lungs rhonchi to auscultation bilaterally   HEENT: Left and right TM is within normal limits No drainage.  EOMI, normal conjunctiva, patent nares, Normal OP No maxillary and frontal sinus tenderness on palpation is present. Pharynx and tonsils are not hyperemic, and without exudate.   Neck: Supple with no adenopathy.  Lymphadenopathy:   No cervical adenopathy on palpation  Lower Body: No right  inguinal adenopathy. No left inguinal adenopathy.   Abdominal:      Palpations: There is no hepatomegaly, splenomegaly or mass. Abdomen is soft, non-tender, and non-distended. No suprapubic tenderness. No CVA tenderness.   Skin:     Comments: no rash   Neurological:      Cranial Nerves: Cranial nerves 2-12 are intact.      Sensory: No sensory deficit.      Motor: Motor function is intact.      Deep Tendon Reflexes: Reflexes are normal and symmetric.       Procedures    Point of Care Test & Imaging Results from this visit  No results found for this visit on 07/03/25.   Imaging  No results found.    Cardiology, Vascular, and Other Imaging  No other imaging results found for the past 2 days      Diagnostic study results (if any) were reviewed by EDI Singleton.    Assessment/Plan   Allergies, medications, history, and pertinent labs/EKGs/Imaging reviewed by EDI Singleton.     Medical Decision Making  On exam patient is non toxic, in no distress. Patient presented with symptoms of cough and shortness of breath for over a week. Reports chest discomfort with coughing and none productive cough.  No chest x-ray is completed in the office today as patient has no shortness of breath dyspnea with minimal rhonchi bilaterally.  No testing is performed as patient's symptoms over a week.  Given patients presentation, treated for acute bronchitis in the setting of atypical infection. Treatment with antibiotic, prednisone and inhaler, patient has at home, as listed below.  Will be discharged with instructions to increase fluid intake, use of OTC for symptoms relief. Advised to follow up with PCP for further management. Advised to return if symptoms worsen and needs to be reevaluated. Reviewed with patient signs and symptoms significant to present to ED. Patient verbalized understanding and agreed with the plan.        Orders and Diagnoses  Diagnoses and all orders for this visit:  Acute bronchitis, unspecified  organism  -     doxycycline (Vibramycin) 100 mg capsule; Take 1 capsule (100 mg) by mouth 2 times a day for 10 days. Take with at least 8 ounces (large glass) of water, do not lie down for 30 minutes after  -     predniSONE (Deltasone) 20 mg tablet; Take 2 tablets (40 mg) by mouth once daily for 5 days.  -     benzonatate (Tessalon) 200 mg capsule; Take 1 capsule (200 mg) by mouth 3 times a day as needed for cough for up to 7 days. Do not crush or chew.  Acute cough  -     benzonatate (Tessalon) 200 mg capsule; Take 1 capsule (200 mg) by mouth 3 times a day as needed for cough for up to 7 days. Do not crush or chew.      Medical Admin Record      Patient disposition: Home    Electronically signed by EDI Singleton  8:45 AM           [1] (Not in a hospital admission)   [2]   Past Medical History:  Diagnosis Date    Acute pharyngitis, unspecified 09/17/2015    Sore throat    BRCA1 negative 7/2020    BRCA2 negative 7/2020    Fibrocystic breast 1/2012    Other specified health status 02/11/2015    No pertinent past medical history    Personal history of other endocrine, nutritional and metabolic disease 12/11/2015    History of hyperthyroidism    Post-menopausal bleeding 11/08/2024    Refractive error 10/04/2024    Retinal hemorrhage of left eye 10/04/2024   [3]   Past Surgical History:  Procedure Laterality Date    BI STEREOTACTIC GUIDED BREAST LEFT LOCALIZATION AND BIOPSY Left 06/19/2019    BI STEREOTACTIC GUIDED BREAST LOCALIZATION AND BIOPSY LEFT [facility]    BREAST BIOPSY  7/2020    JOINT REPLACEMENT Right 9/28/2023    hip    TONSILLECTOMY  12/11/1973    Tonsillectomy

## 2025-07-04 ENCOUNTER — TELEPHONE (OUTPATIENT)
Dept: URGENT CARE | Age: 60
End: 2025-07-04

## 2025-09-26 ENCOUNTER — APPOINTMENT (OUTPATIENT)
Dept: GASTROENTEROLOGY | Facility: CLINIC | Age: 60
End: 2025-09-26
Payer: COMMERCIAL

## 2025-10-10 ENCOUNTER — APPOINTMENT (OUTPATIENT)
Dept: RADIOLOGY | Facility: HOSPITAL | Age: 60
End: 2025-10-10